# Patient Record
Sex: MALE | Race: BLACK OR AFRICAN AMERICAN | NOT HISPANIC OR LATINO | URBAN - METROPOLITAN AREA
[De-identification: names, ages, dates, MRNs, and addresses within clinical notes are randomized per-mention and may not be internally consistent; named-entity substitution may affect disease eponyms.]

---

## 2020-08-31 ENCOUNTER — OUTPATIENT (OUTPATIENT)
Dept: OUTPATIENT SERVICES | Facility: HOSPITAL | Age: 45
LOS: 1 days | End: 2020-08-31
Payer: COMMERCIAL

## 2020-08-31 VITALS
OXYGEN SATURATION: 99 % | RESPIRATION RATE: 16 BRPM | TEMPERATURE: 99 F | DIASTOLIC BLOOD PRESSURE: 86 MMHG | SYSTOLIC BLOOD PRESSURE: 136 MMHG | WEIGHT: 149.91 LBS | HEART RATE: 64 BPM | HEIGHT: 68 IN

## 2020-08-31 DIAGNOSIS — K40.90 UNILATERAL INGUINAL HERNIA, WITHOUT OBSTRUCTION OR GANGRENE, NOT SPECIFIED AS RECURRENT: ICD-10-CM

## 2020-08-31 DIAGNOSIS — Z01.818 ENCOUNTER FOR OTHER PREPROCEDURAL EXAMINATION: ICD-10-CM

## 2020-08-31 PROCEDURE — G0463: CPT

## 2020-08-31 NOTE — H&P PST ADULT - HISTORY OF PRESENT ILLNESS
46 yo female reports the above. She is scheduled for : Repair of Right Inguinal Hernia, on 9/9/20 46 yo male no significant PMHx, reports the above. He is scheduled for : Repair of Right Inguinal Hernia, on 9/9/20. Patient denies pain or discomfort in the right groin

## 2020-08-31 NOTE — H&P PST ADULT - NSICDXPROBLEM_GEN_ALL_CORE_FT
PROBLEM DIAGNOSES  Problem: Unilateral inguinal hernia, without obstruction or gangrene, not specified as recurrent  Assessment and Plan: Repair of Right Inguinal Hernia

## 2020-08-31 NOTE — H&P PST ADULT - NSANTHOSAYNRD_GEN_A_CORE
No. ROME screening performed.  STOP BANG Legend: 0-2 = LOW Risk; 3-4 = INTERMEDIATE Risk; 5-8 = HIGH Risk

## 2020-08-31 NOTE — H&P PST ADULT - ASSESSMENT
46 yo female is scheduled for : Repair of Right Inguinal Hernia, on 9/9/20 44 yo male is scheduled for : Repair of Right Inguinal Hernia, on 9/9/20

## 2020-09-03 DIAGNOSIS — Z01.818 ENCOUNTER FOR OTHER PREPROCEDURAL EXAMINATION: ICD-10-CM

## 2020-09-03 PROBLEM — Z00.00 ENCOUNTER FOR PREVENTIVE HEALTH EXAMINATION: Status: ACTIVE | Noted: 2020-09-03

## 2020-09-07 ENCOUNTER — APPOINTMENT (OUTPATIENT)
Dept: DISASTER EMERGENCY | Facility: CLINIC | Age: 45
End: 2020-09-07

## 2020-09-08 ENCOUNTER — TRANSCRIPTION ENCOUNTER (OUTPATIENT)
Age: 45
End: 2020-09-08

## 2020-09-08 LAB — SARS-COV-2 N GENE NPH QL NAA+PROBE: NOT DETECTED

## 2020-09-09 ENCOUNTER — OUTPATIENT (OUTPATIENT)
Dept: OUTPATIENT SERVICES | Facility: HOSPITAL | Age: 45
LOS: 1 days | End: 2020-09-09
Payer: COMMERCIAL

## 2020-09-09 VITALS
DIASTOLIC BLOOD PRESSURE: 70 MMHG | HEART RATE: 70 BPM | OXYGEN SATURATION: 100 % | SYSTOLIC BLOOD PRESSURE: 118 MMHG | TEMPERATURE: 98 F | RESPIRATION RATE: 14 BRPM

## 2020-09-09 VITALS
HEART RATE: 75 BPM | SYSTOLIC BLOOD PRESSURE: 115 MMHG | TEMPERATURE: 99 F | RESPIRATION RATE: 16 BRPM | DIASTOLIC BLOOD PRESSURE: 67 MMHG | WEIGHT: 149.91 LBS | OXYGEN SATURATION: 99 % | HEIGHT: 68 IN

## 2020-09-09 DIAGNOSIS — K40.90 UNILATERAL INGUINAL HERNIA, WITHOUT OBSTRUCTION OR GANGRENE, NOT SPECIFIED AS RECURRENT: ICD-10-CM

## 2020-09-09 PROCEDURE — 49505 PRP I/HERN INIT REDUC >5 YR: CPT | Mod: RT

## 2020-09-09 PROCEDURE — C1781: CPT

## 2020-09-09 RX ORDER — SODIUM CHLORIDE 9 MG/ML
3 INJECTION INTRAMUSCULAR; INTRAVENOUS; SUBCUTANEOUS EVERY 8 HOURS
Refills: 0 | Status: DISCONTINUED | OUTPATIENT
Start: 2020-09-09 | End: 2020-09-09

## 2020-09-09 RX ORDER — OXYCODONE AND ACETAMINOPHEN 5; 325 MG/1; MG/1
1 TABLET ORAL ONCE
Refills: 0 | Status: DISCONTINUED | OUTPATIENT
Start: 2020-09-09 | End: 2020-09-16

## 2020-09-09 RX ORDER — HYDROMORPHONE HYDROCHLORIDE 2 MG/ML
0.5 INJECTION INTRAMUSCULAR; INTRAVENOUS; SUBCUTANEOUS
Refills: 0 | Status: DISCONTINUED | OUTPATIENT
Start: 2020-09-09 | End: 2020-09-09

## 2020-09-09 RX ORDER — FENTANYL CITRATE 50 UG/ML
25 INJECTION INTRAVENOUS
Refills: 0 | Status: DISCONTINUED | OUTPATIENT
Start: 2020-09-09 | End: 2020-09-09

## 2020-09-09 RX ADMIN — SODIUM CHLORIDE 3 MILLILITER(S): 9 INJECTION INTRAMUSCULAR; INTRAVENOUS; SUBCUTANEOUS at 10:07

## 2020-09-09 NOTE — ASU DISCHARGE PLAN (ADULT/PEDIATRIC) - CALL YOUR DOCTOR IF YOU HAVE ANY OF THE FOLLOWING:
Pain not relieved by Medications/Fever greater than (need to indicate Fahrenheit or Celsius)/Swelling that gets worse/Bleeding that does not stop/Wound/Surgical Site with redness, or foul smelling discharge or pus/Inability to tolerate liquids or foods/Nausea and vomiting that does not stop

## 2020-09-09 NOTE — ASU DISCHARGE PLAN (ADULT/PEDIATRIC) - CARE PROVIDER_API CALL
Mario Casanova  COLON/RECTAL SURGERY  [address] [place] [address]  Phone: [phone]  Fax: [phone]  Established Patient  Follow Up Time: 2 weeks

## 2020-09-09 NOTE — ASU PATIENT PROFILE, ADULT - HARM RISK FACTORS
Mom called and stated that Lawrence was seen 02/11, but he's still having the same symptoms of vomiting and diarrhea. Would like to speak with nurse if he should be seen again, unable to reach nurse. Mom scheduled appt online for 830 with PCP.       no

## 2021-12-23 NOTE — ASU PREOP CHECKLIST - NEEDLE GAUGE
PROCEDURE REPORT  PATIENT:  Jack Cates   MRN:  [id number]     DIAGNOSIS:  Acute cholecystitis and Gallstones    PROCEDURE:  Laparoscopic cholecystectomy.      SURGEON:  Jamie Magana MD, FACS.    ASSISTANT:  BROOKLYN Renee  The assistant provided care during opening and closing in addition to retraction and exposure during the entire case  Circulator: Abbi Miller RN  Scrub Person: ST Leena  Surgical Assistant: Gilma Renee     ANESTHESIA:  General anesthesia.  {Anesthesia local:1% lidocaine with epinephrine  Anesthesiologist: Jarett Nieves MD  Anesthesia Technician: Israel Christina     ESTIMATED BLOOD LOSS:  scant mL.    FINDINGS:  Stones with mild inflammation.    COMPLICATIONS:  None apparent.    SPECIMEN:  Gallbladder to pathology in formalin.  The gallbladder was inspected at the conclusion of the case.  The gallbladder wall was normal, without masses.      DISPOSITION:  Discharge to home.  Follow-up is scheduled in 2 weeks.  The patient was advised to call or return sooner with any worsening, questions, issues, or concerns.    STATEMENT OF MEDICAL NECESSITY:  Mr. Cates is a 70 year old male who presents with Acute cholecystitis and Gallstones .  We elected to proceed with a laparoscopic cholecystectomy.  The risks, benefits, and alternatives were explained (including risk of bleeding, infection, common bile duct injury, conversion to open, etc) and written consent was obtained.  Please see the consent form/clinic notes for additional details about the consent process.  The planned procedure and location were confirmed with the patient prior to the procedure.    PROCEDURE:  The patient was taken to the operating room and placed supine on the operating room table.  General anesthesia was induced without difficulty.  The patient was positioned and all pressure points were checked and padded.  Appropriate lines and monitors were placed.  IV antibiotics were not given and sequential  compression devices were placed.  The time out procedure was performed.       The patient's abdomen was prepped and draped in the usual sterile fashion. An infraumbilical 5 mm incision was fashioned, the abdominal wall fascia was grasped with a kocher clamp and slightly elevated. A Veress needle was utilized to gain access into the intra-abdominal cavity. This was confirmed with saline drip test. Insufflation was then obtained to 15 mmHg. Upon completion of this, a 5 mm VersaStep trocar was placed. Once this was accomplished, a 0° 5 mm telescope was inserted into the abdominal cavity. Visualization of the abdomen showed no evidence of injury to the small bowel or large bowel or other abdominal organs. We then visualized the right upper quadrant. The gallbladder was subsequently visualized. A erasmo-xiphoid 11 mm VersaStep trocar was placed as were two right upper quadrant VersaStep Trocars (5mm) under direct vision without difficulty. The gallbladder was grasped cephalic and laterally and the triangle of Calot  dissection was accomplished  without difficulty. Once this was accomplished, the cystic duct and cystic artery were readily identified. Then two clips were placed distally on the cystic duct and one proximally. Subsequently, two clips were placed proximally on the cystic artery and one distally. The cystic duct and cystic artery were transected with laparoscopic scissors. The gallbladder was then removed from the liver bed with hook Bovie cautery. Once this was accomplished, the gallbladder was removed through the erasmo-xiphoid port and sent to pathology. Hemostasis was confirmed on the liver bed. . Once we again confirmed hemostasis. A combination of bicarbonate, Marcaine, and normal saline totaling 80 mL was injected into the right upper quadrant. Insufflation was removed and trocars were removed under direct vision; hemostasis remained excellent. All wounds were injected with local anesthetic and approximated  with 4-0 Monocryl  subcuticular sutures. Dermabond was applied to all wounds.  Counts were correct at the conclusion of the case.  The patient was awakened from anesthesia at the conclusion of the case and extubated without any difficulties.  Disposition as above.     20

## 2023-10-10 NOTE — H&P PST ADULT - CONSTITUTIONAL
detailed exam
General Sunscreen Counseling: I recommended a broad spectrum sunscreen with a SPF of 30 or higher. I explained that SPF 30 sunscreens block approximately 97 percent of the sun's harmful rays. Sunscreens should be applied at least 15 minutes prior to expected sun exposure and then every 2 hours after that as long as sun exposure continues. If swimming or exercising sunscreen should be reapplied every 45 minutes to an hour after getting wet or sweating. One ounce, or the equivalent of a shot glass full of sunscreen, is adequate to protect the skin not covered by a bathing suit. I also recommended a lip balm with a sunscreen as well. Sun protective clothing can be used in lieu of sunscreen but must be worn the entire time you are exposed to the sun's rays.
Products Recommended: [employer], [employer] or Eucerin sunscreen
Detail Level: Detailed